# Patient Record
Sex: MALE | Race: WHITE | NOT HISPANIC OR LATINO | URBAN - METROPOLITAN AREA
[De-identification: names, ages, dates, MRNs, and addresses within clinical notes are randomized per-mention and may not be internally consistent; named-entity substitution may affect disease eponyms.]

---

## 2017-04-06 PROBLEM — 313436004 TYPE II DIABETES MELLITUS WITHOUT COMPLICATION: Status: ACTIVE | Noted: 2017-04-06

## 2017-04-06 PROBLEM — 38341003 HYPERTENSION: Status: ACTIVE | Noted: 2017-04-06

## 2017-04-06 PROBLEM — 13644009 HYPERCHOLESTEROLEMIA: Status: ACTIVE | Noted: 2017-04-06

## 2017-04-06 PROBLEM — 254900004 PROSTATE CANCER: Status: ACTIVE | Noted: 2017-04-06

## 2021-08-28 ENCOUNTER — TELEPHONE ENCOUNTER (OUTPATIENT)
Dept: URBAN - METROPOLITAN AREA CLINIC 13 | Facility: CLINIC | Age: 71
End: 2021-08-28

## 2021-08-29 ENCOUNTER — TELEPHONE ENCOUNTER (OUTPATIENT)
Dept: URBAN - METROPOLITAN AREA CLINIC 13 | Facility: CLINIC | Age: 71
End: 2021-08-29

## 2021-08-29 RX ORDER — METOPROLOL SUCCINATE 25 MG/1
TABLET, EXTENDED RELEASE ORAL
Status: ACTIVE | COMMUNITY

## 2021-08-29 RX ORDER — ATORVASTATIN CALCIUM 20 MG/1
TABLET ORAL
Status: ACTIVE | COMMUNITY

## 2021-08-29 RX ORDER — ASPIRIN 81 MG/1
TABLET ORAL
Status: ACTIVE | COMMUNITY

## 2021-08-29 RX ORDER — METFORMIN HCL 1000 MG/1
TABLET ORAL
Status: ACTIVE | COMMUNITY

## 2021-08-29 RX ORDER — AMLODIPINE BESYLATE 2.5 MG/1
TABLET ORAL
Status: ACTIVE | COMMUNITY

## 2024-06-11 ENCOUNTER — DASHBOARD ENCOUNTERS (OUTPATIENT)
Age: 74
End: 2024-06-11

## 2024-06-11 ENCOUNTER — LAB OUTSIDE AN ENCOUNTER (OUTPATIENT)
Dept: URBAN - METROPOLITAN AREA CLINIC 48 | Facility: CLINIC | Age: 74
End: 2024-06-11

## 2024-06-11 ENCOUNTER — OFFICE VISIT (OUTPATIENT)
Dept: URBAN - METROPOLITAN AREA CLINIC 48 | Facility: CLINIC | Age: 74
End: 2024-06-11
Payer: MEDICARE

## 2024-06-11 VITALS
BODY MASS INDEX: 25.79 KG/M2 | HEIGHT: 71 IN | WEIGHT: 184.2 LBS | TEMPERATURE: 98.2 F | DIASTOLIC BLOOD PRESSURE: 67 MMHG | SYSTOLIC BLOOD PRESSURE: 132 MMHG | HEART RATE: 69 BPM

## 2024-06-11 DIAGNOSIS — R79.89 ELEVATED LFTS: ICD-10-CM

## 2024-06-11 DIAGNOSIS — R11.2 NAUSEA AND VOMITING IN ADULT: ICD-10-CM

## 2024-06-11 DIAGNOSIS — Z12.11 SCREENING FOR COLON CANCER: ICD-10-CM

## 2024-06-11 PROCEDURE — 99204 OFFICE O/P NEW MOD 45 MIN: CPT

## 2024-06-11 RX ORDER — PANTOPRAZOLE SODIUM 40 MG/1
1 TABLET TABLET, DELAYED RELEASE ORAL
Qty: 90 | Refills: 3 | OUTPATIENT
Start: 2024-06-11

## 2024-06-11 RX ORDER — ATORVASTATIN CALCIUM 80 MG/1
1 TABLET TABLET, FILM COATED ORAL ONCE A DAY
Status: ACTIVE | COMMUNITY

## 2024-06-11 RX ORDER — METOPROLOL SUCCINATE 50 MG/1
1 TABLET TABLET, FILM COATED, EXTENDED RELEASE ORAL ONCE A DAY
Status: ACTIVE | COMMUNITY

## 2024-06-11 RX ORDER — GLIMEPIRIDE 4 MG/1
2 TABLETS WITH BREAKFAST OR THE FIRST MAIN MEAL OF THE DAY TABLET ORAL ONCE A DAY
Status: ACTIVE | COMMUNITY

## 2024-06-11 RX ORDER — AMLODIPINE BESYLATE 5 MG/1
1 TABLET TABLET ORAL ONCE A DAY
Status: ACTIVE | COMMUNITY

## 2024-06-11 RX ORDER — METFORMIN HCL 1000 MG/1
1 TABLET WITH A MEAL TABLET ORAL TWICE A DAY
Status: ACTIVE | COMMUNITY

## 2024-06-11 NOTE — HPI-TODAY'S VISIT:
73-year-old male presents for hospital follow-up and screening colonoscopy. In 3/2024, patient had 2-day episode of persistent vomiting w/o hematemesis. He vomited approx 25 times, and was also endorsing epigastric soreness following vomiting. This began after eating a salad, and nobody else in household had similar symptoms. Patient presented to [facility], and EKG, CXR, ECHO showed no cardiopulmonary cause of persistent vomiting. CT abdomen pelvis 3/30/2024 showed no acute abdominopelvic process, but did show cholelithiasis and colonic diverticulosis. Labs in ED remarkable for elevated WBCs of 17.9, which increased to 19.8 on 6/6/24. Labs on 6/6/2024 showed elevated AST at 117, elevated ALT at 163, and elevated T. bili at 5.6. Previous labs on 3/30/2024 at [facility] ED showed normal LFTs. Patient was discharged from [facility] ED on zofran. He had another episode of vomiting last week, but reports it was milder after he took zofran. He uses Aleve infrequently for joint/muscle pains. He has never had EGD. Currently, patient denies heartburn, dysphagia, abd pain, N/V, early satiety, bloating, belching. He has lost 10 lbs in the past week because he is afraid to eat.  EKG 4/1/2024 showed age-indeterminate inferior infarct, normal sinus rhythm. Patient had STEMI in 2021, status post PCI with drug-eluting stent. Follows with cardiologist Dr. Fair in [place]. He is currently on aspirin 81 mg.  Last colonoscopy 2/10/2017 with diverticulosis, multiple polyps, fragments of tubular adenomas on path, internal hemorrhoids. Recommended repeat colon in 5 years. There is no family history of colon polyps or colon cancer. He states he has always been constipated, and has BM every other day or every third day. He takes milk of magnesia as needed 1-2x/week. Patient denies change in bowel habits, rectal bleeding. Patient denies any lung or kidney problems. No pacemaker/defibrillator, home O2, dialysis. He is diabetic, and takes glimepiride and metformin.

## 2024-06-15 LAB
ALBUMIN/GLOBULIN RATIO: 1.3
ALBUMIN: 4
ALKALINE PHOSPHATASE: 241
ALT: 65
AST: 53
BILIRUBIN, TOTAL: 1.1
BUN/CREATININE RATIO: (no result)
CALCIUM: 9.1
CARBON DIOXIDE: 22
CHLORIDE: 102
CREATININE: 0.84
EGFR: 92
FIB 4 INDEX: 1.58
GLOBULIN: 3
GLUCOSE: 160
PLATELET COUNT: 303
POTASSIUM: 4.1
PROTEIN, TOTAL: 7
SODIUM: 135
UREA NITROGEN (BUN): 13

## 2024-06-18 ENCOUNTER — TELEPHONE ENCOUNTER (OUTPATIENT)
Dept: URBAN - METROPOLITAN AREA CLINIC 44 | Facility: CLINIC | Age: 74
End: 2024-06-18

## 2024-06-19 ENCOUNTER — WEB ENCOUNTER (OUTPATIENT)
Dept: URBAN - METROPOLITAN AREA CLINIC 48 | Facility: CLINIC | Age: 74
End: 2024-06-19